# Patient Record
Sex: FEMALE | Race: WHITE | Employment: UNEMPLOYED | URBAN - METROPOLITAN AREA
[De-identification: names, ages, dates, MRNs, and addresses within clinical notes are randomized per-mention and may not be internally consistent; named-entity substitution may affect disease eponyms.]

---

## 2017-07-12 ENCOUNTER — HOSPITAL ENCOUNTER (OUTPATIENT)
Age: 47
Setting detail: OUTPATIENT SURGERY
End: 2017-07-12
Attending: OBSTETRICS & GYNECOLOGY | Admitting: OBSTETRICS & GYNECOLOGY

## 2017-07-30 RX ORDER — CEFAZOLIN SODIUM IN 0.9 % NACL 2 G/50 ML
2 INTRAVENOUS SOLUTION, PIGGYBACK (ML) INTRAVENOUS ONCE
Status: CANCELLED | OUTPATIENT
Start: 2017-07-30 | End: 2017-07-31

## 2017-08-01 ENCOUNTER — HOSPITAL ENCOUNTER (OUTPATIENT)
Dept: PREADMISSION TESTING | Age: 47
Discharge: HOME | End: 2017-08-01
Payer: COMMERCIAL

## 2017-08-01 VITALS
BODY MASS INDEX: 23.75 KG/M2 | HEART RATE: 82 BPM | SYSTOLIC BLOOD PRESSURE: 106 MMHG | WEIGHT: 139.13 LBS | RESPIRATION RATE: 20 BRPM | HEIGHT: 64 IN | DIASTOLIC BLOOD PRESSURE: 69 MMHG | TEMPERATURE: 98.1 F

## 2017-08-01 LAB
ERYTHROCYTE [DISTWIDTH] IN BLOOD BY AUTOMATED COUNT: 14.4 % (ref 11.5–14.5)
HCT VFR BLD AUTO: 34.7 % (ref 35–47)
HGB BLD-MCNC: 11.2 G/DL (ref 11.5–16)
MCH RBC QN AUTO: 27.4 PG (ref 26–34)
MCHC RBC AUTO-ENTMCNC: 32.3 G/DL (ref 30–36.5)
MCV RBC AUTO: 84.8 FL (ref 80–99)
PLATELET # BLD AUTO: 248 K/UL (ref 150–400)
RBC # BLD AUTO: 4.09 M/UL (ref 3.8–5.2)
WBC # BLD AUTO: 6.4 K/UL (ref 3.6–11)

## 2017-08-01 PROCEDURE — 85027 COMPLETE CBC AUTOMATED: CPT | Performed by: OBSTETRICS & GYNECOLOGY

## 2017-08-01 PROCEDURE — 36415 COLL VENOUS BLD VENIPUNCTURE: CPT | Performed by: OBSTETRICS & GYNECOLOGY

## 2021-12-14 NOTE — PERIOP NOTES
COVID QUESTIONS ANSWERED/PT IN [facility] 12-18 THRU 12-27/ PAT: 12-16-21 @ 0930/ WILL GET PCR COVID TST IN [facility]/GAVE OUR FAX NUMBER FOR RESULTS/INSTRUCTED TO TAKE COPY OF RESULTS TO HOSP DAY OF SX/INSTRUCTED TO BRING COVID CARD TO PAT APPT BY BOYD GUTIERRES

## 2021-12-16 ENCOUNTER — HOSPITAL ENCOUNTER (OUTPATIENT)
Dept: PREADMISSION TESTING | Age: 51
Discharge: HOME | End: 2021-12-16
Payer: COMMERCIAL

## 2021-12-16 VITALS
BODY MASS INDEX: 26.72 KG/M2 | TEMPERATURE: 98 F | HEIGHT: 64 IN | HEART RATE: 81 BPM | WEIGHT: 156.53 LBS | DIASTOLIC BLOOD PRESSURE: 85 MMHG | RESPIRATION RATE: 20 BRPM | SYSTOLIC BLOOD PRESSURE: 132 MMHG

## 2021-12-16 LAB
ATRIAL RATE: 74 BPM
CALCULATED P AXIS, ECG09: 74 DEGREES
CALCULATED R AXIS, ECG10: 33 DEGREES
CALCULATED T AXIS, ECG11: 20 DEGREES
DIAGNOSIS, 93000: NORMAL
ERYTHROCYTE [DISTWIDTH] IN BLOOD BY AUTOMATED COUNT: 14.8 % (ref 11.5–14.5)
HCT VFR BLD AUTO: 37.3 % (ref 35–47)
HGB BLD-MCNC: 11.7 G/DL (ref 11.5–16)
MCH RBC QN AUTO: 27.5 PG (ref 26–34)
MCHC RBC AUTO-ENTMCNC: 31.4 G/DL (ref 30–36.5)
MCV RBC AUTO: 87.8 FL (ref 80–99)
NRBC # BLD: 0 K/UL (ref 0–0.01)
NRBC BLD-RTO: 0 PER 100 WBC
P-R INTERVAL, ECG05: 168 MS
PLATELET # BLD AUTO: 240 K/UL (ref 150–400)
PMV BLD AUTO: 9.9 FL (ref 8.9–12.9)
Q-T INTERVAL, ECG07: 388 MS
QRS DURATION, ECG06: 78 MS
QTC CALCULATION (BEZET), ECG08: 430 MS
RBC # BLD AUTO: 4.25 M/UL (ref 3.8–5.2)
VENTRICULAR RATE, ECG03: 74 BPM
WBC # BLD AUTO: 6.7 K/UL (ref 3.6–11)

## 2021-12-16 PROCEDURE — 85027 COMPLETE CBC AUTOMATED: CPT

## 2021-12-16 PROCEDURE — 93005 ELECTROCARDIOGRAM TRACING: CPT

## 2021-12-16 NOTE — PERIOP NOTES
[facility]    Surgery Date:   12-28-21    Surgery arrival time given by surgeon: NO     If no, [place]'s staff will call you between 4 PM- 8 PM the day before surgery with your arrival time. If your surgery is on a Monday, we will call you the preceding Friday. Please call [phone] after 8 PM if you did not receive your arrival time. 1. Please report to [facility] Patient Access/Admitting on the 1st floor. Bring your insurance card, photo identification, and any copayment ( if applicable). 2. If you are going home the same day of your surgery, you must have a responsible adult to drive you home. You need to have a responsible adult to stay with you the first 24 hours after surgery and you should not drive a car for 24 hours following your surgery. 3. Nothing to eat or drink after midnight the night before surgery. This includes no water, gum, mints, coffee, juice, etc.  Please note special instructions, if applicable, below for medications. 4. Do NOT drink alcohol or smoke 24 hours before surgery. STOP smoking for 14 days prior as it helps with breathing and healing after surgery. 5. If you are being admitted to the hospital, please leave personal belongings/luggage in your car until you have an assigned hospital room number. 6. Please wear comfortable clothes. Wear your glasses instead of contacts. We ask that all money, jewelry and valuables be left at home. Wear no make up, particularly mascara, the day of surgery. 7.  All body piercings, rings, and jewelry need to be removed and left at home. Please wear your hair loose or down. Please no pony-tails, buns, or any metal hair accessories. If you shower the morning of surgery, please do not apply any lotions or powders afterwards. You may wear deodorant, unless having breast surgery. Do not shave any body area within 24 hours of your surgery.   8. Please follow all instructions to avoid any potential surgical cancellation. 9. Should your physical condition change, (i.e. fever, cold, flu, etc.) please notify your surgeon as soon as possible. 10. It is important to be on time. If a situation occurs where you may be delayed, please call:  [phone] / [phone] on the day of surgery. 11. The Preadmission Testing staff can be reached at [phone]. 12. Special instructions: NA      Current Outpatient Medications   Medication Sig    calcium-mag oxide-vitamin d3 185- mg-mg-unit cap Take 2 Tablets by mouth two (2) times a day.  acetaminophen/diphenhydramine (TYLENOL PM PO) Take  by mouth nightly as needed.  diphenhydramine HCl (UNISOM, DIPHENHYDRAMINE, PO) Take  by mouth nightly as needed.  OTHER JUICE PLUS 1 DAILY     No current facility-administered medications for this encounter. 1. YOU MUST ONLY TAKE THESE MEDICATIONS THE MORNING OF SURGERY WITH A SIP OF WATER: NA  2. MEDICATIONS TO TAKE THE MORNING OF SURGERY ONLY IF NEEDED: NA  3. HOLD these medications BEFORE Surgery: JUICE PLUS 7 DAYS PRIOR TO SURGERY  4. Ask your surgeon/prescribing physician about when/if to STOP taking these medications: NA  5. Stop all vitamins, herbal medicines and Aspirin containing products 7 days prior to surgery. Stop any non-steroidal anti-inflammatory drugs (i.e. Ibuprofen, Naproxen, Advil, Aleve) 3 days before surgery. You may take Tylenol. 6. If you are currently taking Plavix, Coumadin,or any other blood-thinning/anticoagulant medication contact your prescribing physician for instructions. Preventing Infections Before and After  Your Surgery    IMPORTANT INSTRUCTIONS    Please read and follow these instructions carefully. If you are unable to comply with the below instructions your procedure will be cancelled. You play an important role in your health and preparation for surgery.  To reduce the germs on your skin you will need to shower with CHG soap (Chorhexidine gluconate 4%) two times before surgery. CHG soap (Hibiclens, Hex-A-Clens or store brand)   CHG soap will be provided at your Preadmission Testing (PAT) appointment.  If you do not have a PAT appointment before surgery, you may arrange to  CHG soap from our office or purchase CHG soap at a pharmacy, grocery or department store.  You need to purchase TWO 4 ounce bottles to use for your 2 showers. Steps to follow:  1. Wash your hair with your normal shampoo and your body with regular soap and rinse well to remove shampoo and soap from your skin. 2. Wet a clean washcloth and turn off the shower. 3. Put CHG soap on washcloth and apply to your entire body from the neck down. Do not use on your head, face or private parts(genitals). Do not use CHG soap on open sores, wounds or areas of skin irritation. 4. Wash you body gently for 5 minutes. Do not wash your skin too hard. This soap does not create lather. Pay special attention to your underarms and from your belly button to your feet. 5. Turn the shower back on and rinse well to get CHG soap off your body. 6. Pat your skin dry with a clean, dry towel. Do not apply lotions or moisturizer. 7. Put on clean clothes and sleep on fresh bed sheets and do not allow pets to sleep with you. Shower with CHG soap 2 times before your surgery   The evening before your surgery   The morning of your surgery      Tips to help prevent infections after your surgery:  1. Protect your surgical wound from germs:  ? Hand washing is the most important thing you and your caregivers can do to prevent infections. ? Keep your bandage clean and dry! ? Do not touch your surgical wound. 2. Use clean, freshly washed towels and washcloths every time you shower; do not share bath linens with others. 3. Until your surgical wound is healed, wear clothing and sleep on bed linens each day that are clean and freshly washed.   4. Do not allow pets to sleep in your bed with you or touch your surgical wound.  5. Do not smoke  smoking delays wound healing. This may be a good time to stop smoking. 6. If you have diabetes, it is important for you to manage your blood sugar levels properly before your surgery as well as after your surgery. Poorly managed blood sugar levels slow down wound healing and prevent you from healing completely. Good Restorationist   Instructions for Pre-Surgery COVID-19 Testing     Across our ministry we have established standard guidelines to ensure the health and safety of our patients, residents and associates as we resume elective services for patients. All patients presenting for surgery are required to have a COVID-19 test result within 96 hours of their scheduled surgery. [facility] is providing this test free of charge to the patient.    Instructions for COVID-19 Testing:     Patients will receive a call from Pre-Admission Testing 4-5 days prior to surgery to schedule a date and time to come to the [address] [place] [address] Drive for their COVID-19 test   Patients are advised to self-quarantine after testing until their scheduled surgery   Once on site, patients will be registered and receive COVID test in their vehicle   If a patient is scheduled for normal Pre Admission Testing 96 hours from date of surgery, the patient will still have their COVID test done at the [address] [place] [address] located at [address] [place] [address] Positive results will be shared with the surgeon and anesthesiologist and may result in cancellation of the elective procedure    Testing Hours and Location:   Address:  [address] [place] [address] Up Pre Admission [address] [facility] in the Discharge Lot on [facility] (Map Attached)  [address] [facility], [address]   Hours: Monday- Friday 7a-3p    PAT Phone Number: [phone]            Patient Information Regarding COVID Restrictions    Patients are advised to self-quarantine after COVID testing up to the day of the scheduled procedure. Day of Procedure     Please park in the parking deck or any designated visitor parking lot.  Enter the facility through the Main Entrance of the hospital.   A temperature check and appropriate symptom/exposure screening will be done prior to entry to the facility.  On the day of surgery, please provide the cell phone number of the person who will be waiting for you to the Patient Access representative at the time of registration.  Please wear a mask on the day of your procedure.  We are now allowing one designated visitor per stay. Pediatric patients may have 2 designated visitors. This one person may come in with you on the day of your procedure.  No visitors under the age of 13.  The designated visitor must also wear a mask.  Once your procedure and the immediate recovery period is completed, a nurse in the recovery area will contact your designated visitor to inform them of your room number or to otherwise review other pertinent information regarding your care.  Social distancing practices are to be adhered to in waiting areas and the cafeteria. The patient was contacted  in person. He  verbalize  understanding of all instructions does not  need reinforcement.

## 2021-12-27 ENCOUNTER — ANESTHESIA EVENT (OUTPATIENT)
Dept: SURGERY | Age: 51
End: 2021-12-27
Payer: COMMERCIAL

## 2021-12-28 ENCOUNTER — HOSPITAL ENCOUNTER (OUTPATIENT)
Age: 51
Discharge: HOME | End: 2021-12-29
Attending: OBSTETRICS & GYNECOLOGY | Admitting: OBSTETRICS & GYNECOLOGY
Payer: COMMERCIAL

## 2021-12-28 ENCOUNTER — ANESTHESIA (OUTPATIENT)
Dept: SURGERY | Age: 51
End: 2021-12-28
Payer: COMMERCIAL

## 2021-12-28 DIAGNOSIS — G89.18 POSTOPERATIVE PAIN: Primary | ICD-10-CM

## 2021-12-28 PROBLEM — Z98.890 STATUS POST SURGERY: Status: ACTIVE | Noted: 2021-12-28

## 2021-12-28 LAB — HCG UR QL: NEGATIVE

## 2021-12-28 PROCEDURE — 88307 TISSUE EXAM BY PATHOLOGIST: CPT

## 2021-12-28 PROCEDURE — 74011250636 HC RX REV CODE- 250/636: Performed by: NURSE ANESTHETIST, CERTIFIED REGISTERED

## 2021-12-28 PROCEDURE — 77030038613 HC SUT PDS STRATA SPIRL J&J -B: Performed by: OBSTETRICS & GYNECOLOGY

## 2021-12-28 PROCEDURE — 36415 COLL VENOUS BLD VENIPUNCTURE: CPT

## 2021-12-28 PROCEDURE — 77030035277 HC OBTRTR BLDELSS DISP INTU -B: Performed by: OBSTETRICS & GYNECOLOGY

## 2021-12-28 PROCEDURE — 74011250636 HC RX REV CODE- 250/636: Performed by: OBSTETRICS & GYNECOLOGY

## 2021-12-28 PROCEDURE — 77030020703 HC SEAL CANN DISP INTU -B: Performed by: OBSTETRICS & GYNECOLOGY

## 2021-12-28 PROCEDURE — G0378 HOSPITAL OBSERVATION PER HR: HCPCS

## 2021-12-28 PROCEDURE — 88305 TISSUE EXAM BY PATHOLOGIST: CPT

## 2021-12-28 PROCEDURE — 77030002933 HC SUT MCRYL J&J -A: Performed by: OBSTETRICS & GYNECOLOGY

## 2021-12-28 PROCEDURE — 88304 TISSUE EXAM BY PATHOLOGIST: CPT

## 2021-12-28 PROCEDURE — C1771 REP DEV, URINARY, W/SLING: HCPCS | Performed by: OBSTETRICS & GYNECOLOGY

## 2021-12-28 PROCEDURE — 74011250637 HC RX REV CODE- 250/637: Performed by: ANESTHESIOLOGY

## 2021-12-28 PROCEDURE — 77030013079 HC BLNKT BAIR HGGR 3M -A: Performed by: ANESTHESIOLOGY

## 2021-12-28 PROCEDURE — 76210000016 HC OR PH I REC 1 TO 1.5 HR: Performed by: OBSTETRICS & GYNECOLOGY

## 2021-12-28 PROCEDURE — 77030026438 HC STYL ET INTUB CARD -A: Performed by: ANESTHESIOLOGY

## 2021-12-28 PROCEDURE — 74011250636 HC RX REV CODE- 250/636: Performed by: ANESTHESIOLOGY

## 2021-12-28 PROCEDURE — 76060000036 HC ANESTHESIA 2.5 TO 3 HR: Performed by: OBSTETRICS & GYNECOLOGY

## 2021-12-28 PROCEDURE — 77030031492 HC PRT ACC BLNT AIRSEAL CNMD -B: Performed by: OBSTETRICS & GYNECOLOGY

## 2021-12-28 PROCEDURE — 77030003578 HC NDL INSUF VERES AMR -B: Performed by: OBSTETRICS & GYNECOLOGY

## 2021-12-28 PROCEDURE — 76010000877 HC OR TIME 2.5 TO 3HR INTENSV - TIER 2: Performed by: OBSTETRICS & GYNECOLOGY

## 2021-12-28 PROCEDURE — 81025 URINE PREGNANCY TEST: CPT

## 2021-12-28 PROCEDURE — 77030018778 HC MANIP UTER VCAR CNMD -B: Performed by: OBSTETRICS & GYNECOLOGY

## 2021-12-28 PROCEDURE — 77030010517 HC APPL SEAL FLOSEL BAXT -B: Performed by: OBSTETRICS & GYNECOLOGY

## 2021-12-28 PROCEDURE — 74011000250 HC RX REV CODE- 250: Performed by: OBSTETRICS & GYNECOLOGY

## 2021-12-28 PROCEDURE — 77030008684 HC TU ET CUF COVD -B: Performed by: ANESTHESIOLOGY

## 2021-12-28 PROCEDURE — 74011250637 HC RX REV CODE- 250/637: Performed by: OBSTETRICS & GYNECOLOGY

## 2021-12-28 PROCEDURE — 74011000258 HC RX REV CODE- 258

## 2021-12-28 PROCEDURE — 74011000250 HC RX REV CODE- 250: Performed by: NURSE ANESTHETIST, CERTIFIED REGISTERED

## 2021-12-28 PROCEDURE — 2709999900 HC NON-CHARGEABLE SUPPLY: Performed by: OBSTETRICS & GYNECOLOGY

## 2021-12-28 PROCEDURE — 77030019927 HC TBNG IRR CYSTO BAXT -A: Performed by: OBSTETRICS & GYNECOLOGY

## 2021-12-28 PROCEDURE — 77030039895 HC SYST SMK EVAC LAP COVD -B: Performed by: OBSTETRICS & GYNECOLOGY

## 2021-12-28 PROCEDURE — 77030040922 HC BLNKT HYPOTHRM STRY -A

## 2021-12-28 DEVICE — SLING INCONT RETROPUBIC CONTINENCE SYS GYNECARE TVT EXACT: Type: IMPLANTABLE DEVICE | Site: VAGINA | Status: FUNCTIONAL

## 2021-12-28 RX ORDER — ONDANSETRON 2 MG/ML
4 INJECTION INTRAMUSCULAR; INTRAVENOUS AS NEEDED
Status: DISCONTINUED | OUTPATIENT
Start: 2021-12-28 | End: 2021-12-28 | Stop reason: HOSPADM

## 2021-12-28 RX ORDER — ACETAMINOPHEN 325 MG/1
650 TABLET ORAL ONCE
Status: COMPLETED | OUTPATIENT
Start: 2021-12-28 | End: 2021-12-28

## 2021-12-28 RX ORDER — SODIUM CHLORIDE 0.9 % (FLUSH) 0.9 %
5-40 SYRINGE (ML) INJECTION AS NEEDED
Status: DISCONTINUED | OUTPATIENT
Start: 2021-12-28 | End: 2021-12-28 | Stop reason: HOSPADM

## 2021-12-28 RX ORDER — METRONIDAZOLE 500 MG/100ML
500 INJECTION, SOLUTION INTRAVENOUS ONCE
Status: COMPLETED | OUTPATIENT
Start: 2021-12-28 | End: 2021-12-28

## 2021-12-28 RX ORDER — FENTANYL CITRATE 50 UG/ML
50 INJECTION, SOLUTION INTRAMUSCULAR; INTRAVENOUS AS NEEDED
Status: DISCONTINUED | OUTPATIENT
Start: 2021-12-28 | End: 2021-12-28 | Stop reason: HOSPADM

## 2021-12-28 RX ORDER — DEXAMETHASONE SODIUM PHOSPHATE 4 MG/ML
INJECTION, SOLUTION INTRA-ARTICULAR; INTRALESIONAL; INTRAMUSCULAR; INTRAVENOUS; SOFT TISSUE AS NEEDED
Status: DISCONTINUED | OUTPATIENT
Start: 2021-12-28 | End: 2021-12-28 | Stop reason: HOSPADM

## 2021-12-28 RX ORDER — SODIUM CHLORIDE 0.9 % (FLUSH) 0.9 %
5-40 SYRINGE (ML) INJECTION EVERY 8 HOURS
Status: DISCONTINUED | OUTPATIENT
Start: 2021-12-28 | End: 2021-12-28 | Stop reason: HOSPADM

## 2021-12-28 RX ORDER — ROCURONIUM BROMIDE 10 MG/ML
INJECTION, SOLUTION INTRAVENOUS AS NEEDED
Status: DISCONTINUED | OUTPATIENT
Start: 2021-12-28 | End: 2021-12-28 | Stop reason: HOSPADM

## 2021-12-28 RX ORDER — PROCHLORPERAZINE EDISYLATE 5 MG/ML
INJECTION INTRAMUSCULAR; INTRAVENOUS
Status: DISPENSED
Start: 2021-12-28 | End: 2021-12-29

## 2021-12-28 RX ORDER — GLYCOPYRROLATE 0.2 MG/ML
INJECTION INTRAMUSCULAR; INTRAVENOUS AS NEEDED
Status: DISCONTINUED | OUTPATIENT
Start: 2021-12-28 | End: 2021-12-28 | Stop reason: HOSPADM

## 2021-12-28 RX ORDER — PHENYLEPHRINE HCL IN 0.9% NACL 0.4MG/10ML
SYRINGE (ML) INTRAVENOUS AS NEEDED
Status: DISCONTINUED | OUTPATIENT
Start: 2021-12-28 | End: 2021-12-28 | Stop reason: HOSPADM

## 2021-12-28 RX ORDER — NEOSTIGMINE METHYLSULFATE 1 MG/ML
INJECTION INTRAVENOUS AS NEEDED
Status: DISCONTINUED | OUTPATIENT
Start: 2021-12-28 | End: 2021-12-28 | Stop reason: HOSPADM

## 2021-12-28 RX ORDER — MORPHINE SULFATE 2 MG/ML
2 INJECTION, SOLUTION INTRAMUSCULAR; INTRAVENOUS
Status: DISCONTINUED | OUTPATIENT
Start: 2021-12-28 | End: 2021-12-29

## 2021-12-28 RX ORDER — KETOROLAC TROMETHAMINE 30 MG/ML
30 INJECTION, SOLUTION INTRAMUSCULAR; INTRAVENOUS
Status: DISCONTINUED | OUTPATIENT
Start: 2021-12-28 | End: 2021-12-29

## 2021-12-28 RX ORDER — SODIUM CHLORIDE 9 MG/ML
INJECTION, SOLUTION INTRAVENOUS
Status: COMPLETED
Start: 2021-12-28 | End: 2021-12-28

## 2021-12-28 RX ORDER — DIPHENHYDRAMINE HYDROCHLORIDE 50 MG/ML
12.5 INJECTION, SOLUTION INTRAMUSCULAR; INTRAVENOUS
Status: DISCONTINUED | OUTPATIENT
Start: 2021-12-28 | End: 2021-12-29 | Stop reason: HOSPADM

## 2021-12-28 RX ORDER — SODIUM CHLORIDE, SODIUM LACTATE, POTASSIUM CHLORIDE, CALCIUM CHLORIDE 600; 310; 30; 20 MG/100ML; MG/100ML; MG/100ML; MG/100ML
50 INJECTION, SOLUTION INTRAVENOUS CONTINUOUS
Status: DISCONTINUED | OUTPATIENT
Start: 2021-12-28 | End: 2021-12-28 | Stop reason: HOSPADM

## 2021-12-28 RX ORDER — SODIUM CHLORIDE 0.9 % (FLUSH) 0.9 %
5-40 SYRINGE (ML) INJECTION EVERY 8 HOURS
Status: DISCONTINUED | OUTPATIENT
Start: 2021-12-28 | End: 2021-12-29 | Stop reason: HOSPADM

## 2021-12-28 RX ORDER — MIDAZOLAM HYDROCHLORIDE 1 MG/ML
INJECTION, SOLUTION INTRAMUSCULAR; INTRAVENOUS AS NEEDED
Status: DISCONTINUED | OUTPATIENT
Start: 2021-12-28 | End: 2021-12-28 | Stop reason: HOSPADM

## 2021-12-28 RX ORDER — SCOLOPAMINE TRANSDERMAL SYSTEM 1 MG/1
1 PATCH, EXTENDED RELEASE TRANSDERMAL ONCE
Status: COMPLETED | OUTPATIENT
Start: 2021-12-28 | End: 2021-12-28

## 2021-12-28 RX ORDER — DEXTROSE, SODIUM CHLORIDE, SODIUM LACTATE, POTASSIUM CHLORIDE, AND CALCIUM CHLORIDE 5; .6; .31; .03; .02 G/100ML; G/100ML; G/100ML; G/100ML; G/100ML
125 INJECTION, SOLUTION INTRAVENOUS CONTINUOUS
Status: DISCONTINUED | OUTPATIENT
Start: 2021-12-28 | End: 2021-12-29 | Stop reason: HOSPADM

## 2021-12-28 RX ORDER — SODIUM CHLORIDE 0.9 % (FLUSH) 0.9 %
5-40 SYRINGE (ML) INJECTION AS NEEDED
Status: DISCONTINUED | OUTPATIENT
Start: 2021-12-28 | End: 2021-12-29 | Stop reason: HOSPADM

## 2021-12-28 RX ORDER — PROPOFOL 10 MG/ML
INJECTION, EMULSION INTRAVENOUS AS NEEDED
Status: DISCONTINUED | OUTPATIENT
Start: 2021-12-28 | End: 2021-12-28 | Stop reason: HOSPADM

## 2021-12-28 RX ORDER — FENTANYL CITRATE 50 UG/ML
25 INJECTION, SOLUTION INTRAMUSCULAR; INTRAVENOUS
Status: DISCONTINUED | OUTPATIENT
Start: 2021-12-28 | End: 2021-12-28 | Stop reason: HOSPADM

## 2021-12-28 RX ORDER — HYDROMORPHONE HYDROCHLORIDE 1 MG/ML
0.2 INJECTION, SOLUTION INTRAMUSCULAR; INTRAVENOUS; SUBCUTANEOUS
Status: DISCONTINUED | OUTPATIENT
Start: 2021-12-28 | End: 2021-12-28 | Stop reason: HOSPADM

## 2021-12-28 RX ORDER — SODIUM CHLORIDE, SODIUM LACTATE, POTASSIUM CHLORIDE, CALCIUM CHLORIDE 600; 310; 30; 20 MG/100ML; MG/100ML; MG/100ML; MG/100ML
INJECTION, SOLUTION INTRAVENOUS
Status: DISCONTINUED | OUTPATIENT
Start: 2021-12-28 | End: 2021-12-28 | Stop reason: HOSPADM

## 2021-12-28 RX ORDER — KETOROLAC TROMETHAMINE 30 MG/ML
INJECTION, SOLUTION INTRAMUSCULAR; INTRAVENOUS AS NEEDED
Status: DISCONTINUED | OUTPATIENT
Start: 2021-12-28 | End: 2021-12-28 | Stop reason: HOSPADM

## 2021-12-28 RX ORDER — DOCUSATE SODIUM 100 MG/1
100 CAPSULE, LIQUID FILLED ORAL 2 TIMES DAILY
Status: DISCONTINUED | OUTPATIENT
Start: 2021-12-28 | End: 2021-12-29 | Stop reason: HOSPADM

## 2021-12-28 RX ORDER — FENTANYL CITRATE 50 UG/ML
INJECTION, SOLUTION INTRAMUSCULAR; INTRAVENOUS AS NEEDED
Status: DISCONTINUED | OUTPATIENT
Start: 2021-12-28 | End: 2021-12-28 | Stop reason: HOSPADM

## 2021-12-28 RX ORDER — BUPIVACAINE HYDROCHLORIDE 5 MG/ML
INJECTION, SOLUTION EPIDURAL; INTRACAUDAL AS NEEDED
Status: DISCONTINUED | OUTPATIENT
Start: 2021-12-28 | End: 2021-12-28 | Stop reason: HOSPADM

## 2021-12-28 RX ORDER — HYDROCODONE BITARTRATE AND ACETAMINOPHEN 5; 325 MG/1; MG/1
1 TABLET ORAL
Status: DISCONTINUED | OUTPATIENT
Start: 2021-12-28 | End: 2021-12-29 | Stop reason: HOSPADM

## 2021-12-28 RX ORDER — ONDANSETRON 2 MG/ML
INJECTION INTRAMUSCULAR; INTRAVENOUS AS NEEDED
Status: DISCONTINUED | OUTPATIENT
Start: 2021-12-28 | End: 2021-12-28 | Stop reason: HOSPADM

## 2021-12-28 RX ORDER — LIDOCAINE HYDROCHLORIDE 10 MG/ML
0.1 INJECTION, SOLUTION EPIDURAL; INFILTRATION; INTRACAUDAL; PERINEURAL AS NEEDED
Status: DISCONTINUED | OUTPATIENT
Start: 2021-12-28 | End: 2021-12-28 | Stop reason: HOSPADM

## 2021-12-28 RX ORDER — HYDROMORPHONE HYDROCHLORIDE 2 MG/ML
INJECTION, SOLUTION INTRAMUSCULAR; INTRAVENOUS; SUBCUTANEOUS AS NEEDED
Status: DISCONTINUED | OUTPATIENT
Start: 2021-12-28 | End: 2021-12-28 | Stop reason: HOSPADM

## 2021-12-28 RX ORDER — MIDAZOLAM HYDROCHLORIDE 1 MG/ML
1 INJECTION, SOLUTION INTRAMUSCULAR; INTRAVENOUS AS NEEDED
Status: DISCONTINUED | OUTPATIENT
Start: 2021-12-28 | End: 2021-12-28 | Stop reason: HOSPADM

## 2021-12-28 RX ORDER — ONDANSETRON 2 MG/ML
4 INJECTION INTRAMUSCULAR; INTRAVENOUS
Status: DISCONTINUED | OUTPATIENT
Start: 2021-12-28 | End: 2021-12-29 | Stop reason: HOSPADM

## 2021-12-28 RX ORDER — LIDOCAINE HYDROCHLORIDE 20 MG/ML
INJECTION, SOLUTION EPIDURAL; INFILTRATION; INTRACAUDAL; PERINEURAL AS NEEDED
Status: DISCONTINUED | OUTPATIENT
Start: 2021-12-28 | End: 2021-12-28 | Stop reason: HOSPADM

## 2021-12-28 RX ADMIN — Medication 120 MCG: at 10:05

## 2021-12-28 RX ADMIN — HYDROCODONE BITARTRATE AND ACETAMINOPHEN 1 TABLET: 5; 325 TABLET ORAL at 18:47

## 2021-12-28 RX ADMIN — KETOROLAC TROMETHAMINE 30 MG: 30 INJECTION, SOLUTION INTRAMUSCULAR; INTRAVENOUS at 12:17

## 2021-12-28 RX ADMIN — HYDROMORPHONE HYDROCHLORIDE 0.5 MG: 2 INJECTION, SOLUTION INTRAMUSCULAR; INTRAVENOUS; SUBCUTANEOUS at 12:17

## 2021-12-28 RX ADMIN — SODIUM CHLORIDE, SODIUM LACTATE, POTASSIUM CHLORIDE, CALCIUM CHLORIDE AND DEXTROSE MONOHYDRATE 125 ML/HR: 5; 600; 310; 30; 20 INJECTION, SOLUTION INTRAVENOUS at 18:47

## 2021-12-28 RX ADMIN — SODIUM CHLORIDE, POTASSIUM CHLORIDE, SODIUM LACTATE AND CALCIUM CHLORIDE: 600; 310; 30; 20 INJECTION, SOLUTION INTRAVENOUS at 11:39

## 2021-12-28 RX ADMIN — ROCURONIUM BROMIDE 20 MG: 10 SOLUTION INTRAVENOUS at 10:31

## 2021-12-28 RX ADMIN — NEOSTIGMINE METHYLSULFATE 3 MG: 1 INJECTION, SOLUTION INTRAVENOUS at 12:19

## 2021-12-28 RX ADMIN — MIDAZOLAM 1 MG: 1 INJECTION INTRAMUSCULAR; INTRAVENOUS at 09:54

## 2021-12-28 RX ADMIN — FENTANYL CITRATE 75 MCG: 50 INJECTION, SOLUTION INTRAMUSCULAR; INTRAVENOUS at 10:30

## 2021-12-28 RX ADMIN — WATER 2 G: 1 INJECTION INTRAMUSCULAR; INTRAVENOUS; SUBCUTANEOUS at 10:00

## 2021-12-28 RX ADMIN — PROPOFOL 200 MG: 10 INJECTION, EMULSION INTRAVENOUS at 09:54

## 2021-12-28 RX ADMIN — SODIUM CHLORIDE, POTASSIUM CHLORIDE, SODIUM LACTATE AND CALCIUM CHLORIDE: 600; 310; 30; 20 INJECTION, SOLUTION INTRAVENOUS at 09:48

## 2021-12-28 RX ADMIN — ROCURONIUM BROMIDE 30 MG: 10 SOLUTION INTRAVENOUS at 09:54

## 2021-12-28 RX ADMIN — PROCHLORPERAZINE EDISYLATE 5 MG: 5 INJECTION, SOLUTION INTRAMUSCULAR; INTRAVENOUS at 13:35

## 2021-12-28 RX ADMIN — SODIUM CHLORIDE, POTASSIUM CHLORIDE, SODIUM LACTATE AND CALCIUM CHLORIDE 50 ML/HR: 600; 310; 30; 20 INJECTION, SOLUTION INTRAVENOUS at 08:34

## 2021-12-28 RX ADMIN — SCOPALAMINE 1 PATCH: 1 PATCH, EXTENDED RELEASE TRANSDERMAL at 09:47

## 2021-12-28 RX ADMIN — DEXAMETHASONE SODIUM PHOSPHATE 4 MG: 4 INJECTION, SOLUTION INTRAMUSCULAR; INTRAVENOUS at 10:42

## 2021-12-28 RX ADMIN — Medication 10 ML: at 23:35

## 2021-12-28 RX ADMIN — HYDROMORPHONE HYDROCHLORIDE 0.5 MG: 2 INJECTION, SOLUTION INTRAMUSCULAR; INTRAVENOUS; SUBCUTANEOUS at 12:24

## 2021-12-28 RX ADMIN — MIDAZOLAM 1 MG: 1 INJECTION INTRAMUSCULAR; INTRAVENOUS at 09:48

## 2021-12-28 RX ADMIN — METRONIDAZOLE 500 MG: 500 SOLUTION INTRAVENOUS at 10:00

## 2021-12-28 RX ADMIN — DOCUSATE SODIUM 100 MG: 100 CAPSULE ORAL at 18:47

## 2021-12-28 RX ADMIN — SODIUM CHLORIDE 50 ML: 900 INJECTION, SOLUTION INTRAVENOUS at 13:35

## 2021-12-28 RX ADMIN — FENTANYL CITRATE 25 MCG: 50 INJECTION, SOLUTION INTRAMUSCULAR; INTRAVENOUS at 09:54

## 2021-12-28 RX ADMIN — ACETAMINOPHEN 650 MG: 325 TABLET ORAL at 08:34

## 2021-12-28 RX ADMIN — ONDANSETRON 4 MG: 2 INJECTION INTRAMUSCULAR; INTRAVENOUS at 12:46

## 2021-12-28 RX ADMIN — ROCURONIUM BROMIDE 20 MG: 10 SOLUTION INTRAVENOUS at 11:13

## 2021-12-28 RX ADMIN — GLYCOPYRROLATE 0.6 MG: 0.2 INJECTION, SOLUTION INTRAMUSCULAR; INTRAVENOUS at 12:19

## 2021-12-28 RX ADMIN — HYDROCODONE BITARTRATE AND ACETAMINOPHEN 1 TABLET: 5; 325 TABLET ORAL at 23:34

## 2021-12-28 RX ADMIN — ONDANSETRON HYDROCHLORIDE 4 MG: 2 INJECTION, SOLUTION INTRAMUSCULAR; INTRAVENOUS at 12:17

## 2021-12-28 RX ADMIN — LIDOCAINE HYDROCHLORIDE 100 MG: 20 INJECTION, SOLUTION EPIDURAL; INFILTRATION; INTRACAUDAL; PERINEURAL at 09:54

## 2021-12-28 NOTE — PERIOP NOTES
TRANSFER - OUT REPORT:    Verbal report given to usman Diaz(name) on Ann Matos  being transferred to 301(unit) for routine post - op       Report consisted of patients Situation, Background, Assessment and   Recommendations(SBAR). Time Pre op antibiotic given  :10am  Ancef+Flagyl  Anesthesia Stop time:  Nj Present on Transfer to floor:yes  Order for Nj on Chart:yes  Discharge Prescriptions with Chart:no    Information from the following report(s) SBAR, Kardex, OR Summary, Procedure Summary, Intake/Output, MAR, Recent Results, Med Rec Status and Cardiac Rhythm SR was reviewed with the receiving nurse. Opportunity for questions and clarification was provided. Is the patient on 02? NO       L/Min        Other     Is the patient on a monitor? NO    Is the nurse transporting with the patient? NO    Surgical Waiting Area notified of patient's transfer from PACU? YES      The following personal items collected during your admission accompanied patient upon transfer:   Dental Appliance: Dental Appliances: None  Vision: Visual Aid: None  Hearing Aid:    Jewelry: Jewelry: None  Clothing: Clothing:  (bag of belongings returned to pt.)  Other Valuables:  Other Valuables: None  Valuables sent to safe:

## 2021-12-28 NOTE — BRIEF OP NOTE
Brief Postoperative Note    Patient: Fred Odonnell  YOB: 1970  MRN: [id number]    Date of Procedure: 12/28/2021     Pre-Op Diagnosis: MENORRHAGIA, DYSMENORRHEA, SUSPENTEC ADENOMYOSIS, STRESS URINARY INCONTINENCE    Post-Op Diagnosis: Same as preoperative diagnosis. Bladder mass right tirgone- benign appearing    Procedure(s):  Dr. Reji Motta, BILATERAL SALPINGECTOMY, Brittney Valdivia,   Dr. Quinonez Maplewood    Surgeon(s):  Gaudencio Eden, [address] [place] [address], [address] [place] [address], MD    Surgical Assistant: Surg Asst-1: Agnes Archibald    Anesthesia: General     Estimated Blood Loss (mL): Minimal    Complications: None    Specimens:   ID Type Source Tests Collected by Time Destination   1 : CERVIX, UTERUS, BILATERAL TUBES AND OVARIES Fresh Uterus  Gaudencio Eden, DO 12/28/2021 1156 Pathology   2 : NECROTIC EPIPLOIC Fresh Other                  Gaudencio Eden, DO 12/28/2021 1158 Pathology   3 : BLADDER BIOPSY TRIGONE Fresh Bladder  Irene Duval MD 12/28/2021 1202 Pathology        Implants: * No implants in log * TVT Exact    Drains: * No LDAs found *    Findings: hypermobile urethra, normal functioning ureters, bladder mucosa w benign ectasia of trigone and ? Calcification/mass of the right tirgone.  No inadvertent cystotomy    Electronically Signed by Merlin Snyder MD on 12/28/2021 at 12:31 PM

## 2021-12-28 NOTE — BRIEF OP NOTE
Brief Postoperative Note    Patient: Anahi Alba  YOB: 1970  MRN: [id number]    Date of Procedure: 12/28/2021     Pre-Op Diagnosis: MENORRHAGIA, DYSMENORRHEA, SUSPECT ADENOMYOSIS, STRESS URINARY INCONTINENCE    Post-Op Diagnosis: Same as preoperative diagnosis. Procedure(s):  DAVINCI TOTAL LAPAROSCOPIC HYSTERECTOMY, BILATERAL SALPINGO-OOPHORECTOMY, TVT SLING, CYSTOSCOPY    Surgeon(s):  DO To Martínez MD    Surgical Assistant: Surg Asst-1: Yvette Parkinson    Anesthesia: General     Estimated Blood Loss (mL): less than 835     Complications: None    Specimens:   ID Type Source Tests Collected by Time Destination   1 : CERVIX, UTERUS, BILATERAL TUBES AND OVARIES Fresh Uterus  Gary Michelle DO 12/28/2021 1156 Pathology   2 : NECROTIC EPIPLOIC Fresh 2 HOUR URINE SAMPLE  Gary Michelle DO 12/28/2021 1158 Pathology        Implants: Flowseal  Drains: * No LDAs found *    Findings: EUA-10 week size mobile anteverted uterus with no adnexal masses. Intraop-Uterus sounded to 11cm. Boggy mobile uterus. Normal fallopian tubes and ovaries bilaterally, 1cm necrotic epiploaca.      Electronically Signed by Nora Lindsay DO on 12/28/2021 at 12:02 PM

## 2021-12-28 NOTE — PROGRESS NOTES
Gynecology Progress Note    Patient doing well post-op day 0 from Procedure(s):  DAVINCI TOTAL LAPAROSCOPIC HYSTERECTOMY,BILATERAL SALPINGO-OOPHORECTOMY, TVT SLING, CYSTOSCOPY without significant complaints. Pain controlled on current medication. Voiding without difficulty. Patient had nausea earlier but feeling better now. Denies cp/sob. Vitals:  Blood pressure 118/67, pulse 97, temperature 97.7 °F (36.5 °C), resp. rate 16, height 5' 4\" (1.626 m), weight 71 kg (156 lb 8.4 oz), last menstrual period 2021, SpO2 99 %. Temp (24hrs), Av.1 °F (36.7 °C), Min:97.1 °F (36.2 °C), Max:99.5 °F (37.5 °C)        Exam:  Patient without distress. Heart regular rate and rhythm   Lungs CTA b/l               Abdomen soft,  Nontender, mildly distended, hypoactive bowel sounds. Incisions dry and clean without erythema. Lower extremities are negative for swelling, cords, or tenderness. Lab/Data Review:  no new labs    Assessment and Plan:  Patient appears to be having uncomplicated post Procedure(s):  DAVINCI TOTAL LAPAROSCOPIC HYSTERECTOMY, BILATERAL SALPINGO-OOPHORECTOMY, TVT SLING, CYSTOSCOPY course. Continue routine post-op care. Hemodynamically stable. Pain controlled. Will advance diet as tolerated. Plan for freitas out in am with bladder challenge per Dr All Molina orders. OOB to chair this evening if able.

## 2021-12-28 NOTE — ANESTHESIA POSTPROCEDURE EVALUATION
Procedure(s):  DAVINCI TOTAL LAPAROSCOPIC HYSTERECTOMY, BILATERAL SALPINGECTOMY, POSSIBLE BILATERAL OOPHORECTOMY, TVT SLING, CYSTOSCOPY. general    Anesthesia Post Evaluation        Patient participation: complete - patient participated  Level of consciousness: awake  Pain management: adequate  Airway patency: patent  Anesthetic complications: no  Cardiovascular status: hemodynamically stable  Respiratory status: acceptable  Hydration status: acceptable  Comments: The patient is ready for PACU discharge. [address], DO                   Post anesthesia nausea and vomiting:  controlled      INITIAL Post-op Vital signs:   Vitals Value Taken Time   /63 12/28/21 1345   Temp 36.2 °C (97.1 °F) 12/28/21 1340   Pulse 88 12/28/21 1351   Resp 15 12/28/21 1351   SpO2 97 % 12/28/21 1351   Vitals shown include unvalidated device data.

## 2021-12-28 NOTE — PROGRESS NOTES
TRANSFER - IN REPORT:    Verbal report received from [address] (name) on Diaz Lis  being received from PACU (unit) for routine post - op      Report consisted of patients Situation, Background, Assessment and   Recommendations(SBAR). Information from the following report(s) SBAR, Kardex, OR Summary and MAR was reviewed with the receiving nurse. Opportunity for questions and clarification was provided. Assessment completed upon patients arrival to unit and care assumed.

## 2021-12-29 VITALS
RESPIRATION RATE: 15 BRPM | SYSTOLIC BLOOD PRESSURE: 114 MMHG | TEMPERATURE: 98.4 F | BODY MASS INDEX: 26.72 KG/M2 | HEIGHT: 64 IN | WEIGHT: 156.53 LBS | DIASTOLIC BLOOD PRESSURE: 72 MMHG | OXYGEN SATURATION: 98 % | HEART RATE: 92 BPM

## 2021-12-29 LAB
ERYTHROCYTE [DISTWIDTH] IN BLOOD BY AUTOMATED COUNT: 15.6 % (ref 11.5–14.5)
HCT VFR BLD AUTO: 34 % (ref 35–47)
HGB BLD-MCNC: 10.9 G/DL (ref 11.5–16)
MCH RBC QN AUTO: 27.5 PG (ref 26–34)
MCHC RBC AUTO-ENTMCNC: 32.1 G/DL (ref 30–36.5)
MCV RBC AUTO: 85.9 FL (ref 80–99)
NRBC # BLD: 0 K/UL (ref 0–0.01)
NRBC BLD-RTO: 0 PER 100 WBC
PLATELET # BLD AUTO: 229 K/UL (ref 150–400)
PMV BLD AUTO: 9.5 FL (ref 8.9–12.9)
RBC # BLD AUTO: 3.96 M/UL (ref 3.8–5.2)
WBC # BLD AUTO: 15.1 K/UL (ref 3.6–11)

## 2021-12-29 PROCEDURE — G0378 HOSPITAL OBSERVATION PER HR: HCPCS

## 2021-12-29 PROCEDURE — 36415 COLL VENOUS BLD VENIPUNCTURE: CPT

## 2021-12-29 PROCEDURE — 74011250637 HC RX REV CODE- 250/637: Performed by: OBSTETRICS & GYNECOLOGY

## 2021-12-29 PROCEDURE — 85027 COMPLETE CBC AUTOMATED: CPT

## 2021-12-29 PROCEDURE — 74011250636 HC RX REV CODE- 250/636: Performed by: OBSTETRICS & GYNECOLOGY

## 2021-12-29 RX ORDER — HYDROCODONE BITARTRATE AND ACETAMINOPHEN 5; 325 MG/1; MG/1
1 TABLET ORAL
Qty: 10 TABLET | Refills: 0 | Status: SHIPPED | OUTPATIENT
Start: 2021-12-29 | End: 2022-01-01

## 2021-12-29 RX ORDER — IBUPROFEN 600 MG/1
600 TABLET ORAL
Qty: 30 TABLET | Refills: 0 | Status: SHIPPED | OUTPATIENT
Start: 2021-12-29

## 2021-12-29 RX ORDER — IBUPROFEN 600 MG/1
600 TABLET ORAL
Status: DISCONTINUED | OUTPATIENT
Start: 2021-12-29 | End: 2021-12-29 | Stop reason: HOSPADM

## 2021-12-29 RX ADMIN — DOCUSATE SODIUM 100 MG: 100 CAPSULE ORAL at 08:38

## 2021-12-29 RX ADMIN — HYDROCODONE BITARTRATE AND ACETAMINOPHEN 1 TABLET: 5; 325 TABLET ORAL at 06:08

## 2021-12-29 RX ADMIN — HYDROCODONE BITARTRATE AND ACETAMINOPHEN 1 TABLET: 5; 325 TABLET ORAL at 10:44

## 2021-12-29 RX ADMIN — Medication 10 ML: at 06:00

## 2021-12-29 RX ADMIN — SODIUM CHLORIDE, SODIUM LACTATE, POTASSIUM CHLORIDE, CALCIUM CHLORIDE AND DEXTROSE MONOHYDRATE 125 ML/HR: 5; 600; 310; 30; 20 INJECTION, SOLUTION INTRAVENOUS at 02:34

## 2021-12-29 RX ADMIN — IBUPROFEN 600 MG: 600 TABLET, FILM COATED ORAL at 08:38

## 2021-12-29 NOTE — DISCHARGE SUMMARY
Discharge Summary     Name: Xi Darling MRN: [id number]  SSN: [id number]    YOB: 1970  Age: 46 y.o. Sex: female      Allergies: Erythromycin    Admit Date: 12/28/2021    Discharge Date: 12/29/2021      Admitting Physician: Laura Evans DO     * Admission Diagnoses: Dysmenorrhea, Menorrhagia and Urinary incontinence    * Discharge Diagnoses:   Hospital Problems as of 12/29/2021 Never Reviewed          Codes Class Noted - Resolved POA    Status post surgery ICD-10-CM: Z98.890  ICD-9-CM: V45.89  12/28/2021 - Present Unknown               * Procedures: Robotic Assisted Total Laparoscopic Hysterectomy  Bilateral Salpingo-Oophorectomy  Suburethral Sling    * Discharge Condition: [facility] Course: Normal hospital course for this procedure. Significant Diagnostic Studies:   Recent Results (from the past 24 hour(s))   HCG URINE, QL. - POC    Collection Time: 12/28/21  7:54 AM   Result Value Ref Range    Pregnancy test,urine (POC) Negative NEG     CBC W/O DIFF    Collection Time: 12/29/21  5:30 AM   Result Value Ref Range    WBC 15.1 (H) 3.6 - 11.0 K/uL    RBC 3.96 3.80 - 5.20 M/uL    HGB 10.9 (L) 11.5 - 16.0 g/dL    HCT 34.0 (L) 35.0 - 47.0 %    MCV 85.9 80.0 - 99.0 FL    MCH 27.5 26.0 - 34.0 PG    MCHC 32.1 30.0 - 36.5 g/dL    RDW 15.6 (H) 11.5 - 14.5 %    PLATELET 950 007 - 703 K/uL    MPV 9.5 8.9 - 12.9 FL    NRBC 0.0 0  WBC    ABSOLUTE NRBC 0.00 0.00 - 0.01 K/uL       * Disposition: Home    Discharge Medications:   Current Discharge Medication List      START taking these medications    Details   HYDROcodone-acetaminophen (NORCO) 5-325 mg per tablet Take 1 Tablet by mouth every six (6) hours as needed for Pain for up to 3 days. Max Daily Amount: 4 Tablets. Qty: 10 Tablet, Refills: 0  Start date: 12/29/2021, End date: 1/1/2022    Associated Diagnoses: Postoperative pain      ibuprofen (MOTRIN) 600 mg tablet Take 1 Tablet by mouth every six (6) hours as needed for Pain.   Qty: 30 Tablet, Refills: 0  Start date: 12/29/2021         CONTINUE these medications which have NOT CHANGED    Details   calcium-mag oxide-vitamin d3 185- mg-mg-unit cap Take 2 Tablets by mouth two (2) times a day. acetaminophen/diphenhydramine (TYLENOL PM PO) Take  by mouth nightly as needed. diphenhydramine HCl (UNISOM, DIPHENHYDRAMINE, PO) Take  by mouth nightly as needed. OTHER JUICE PLUS 1 DAILY              * Follow-up Care/Patient Instructions: Activity: No sex, douching, or tampons for 6 weeks or as directed by your physician. No heavy lifting for 6 weeks. No driving while taking pain medication.   Diet: Resume pre-hospital diet  Wound Care: Keep wound clean and dry    Follow-up Information     Follow up With Specialties Details Why Contact Info    Shantel Tom, Ericka[address] [facility] Gynecology, Gynecology, Obstetrics In 2 weeks  [address] [place] [id number] [phone]

## 2021-12-29 NOTE — PROGRESS NOTES
Bedside and Verbal shift change report given to TOÑITO Manuel RN (oncoming nurse) by Rochelle Branch RN (offgoing nurse). Report included the following information SBAR, Kardex, Intake/Output, MAR, Recent Results and Med Rec Status.

## 2021-12-29 NOTE — DISCHARGE INSTRUCTIONS
PATIENT DISCHARGE INSTRUCTIONS      PATIENT DISCHARGE INSTRUCTIONS    Consuela Sever / [id number] : 1970    Admitted 2021 Discharged: 2021       · It is important that you take the medication exactly as they are prescribed. · Keep your medication in the bottles provided by the pharmacist and keep a list of the medication names, dosages, and times to be taken in your wallet. · Do not take other medications without consulting your doctor. What to do at Home    Recommended Diet: Regular Diet    Recommended Activity: no lifting >10 pounds x 8 weeks, no sex, douching, tampons, bath/pool x 8 weeks. If you experience any of the following symptoms excessive vaginal bleeding, pain, nausea/vomiting or temp >100.6, please follow up with Dr Radha Kelly.

## 2021-12-29 NOTE — OP NOTES
[address]   OPERATIVE REPORT    Name:  Patti Joyner  MR#:  [id number]  :  1970  ACCOUNT #:  [de-identified]  DATE OF SERVICE:  2021      PREOPERATIVE DIAGNOSES:  Menorrhagia, dysmenorrhea, stress urinary incontinence. POSTOPERATIVE DIAGNOSES:  Menorrhagia, dysmenorrhea, stress urinary incontinence. PROCEDURES PERFORMED:  Alok Dela Cruz total laparoscopic hysterectomy and bilateral salpingo-oophorectomy, tension-free vaginal tape sling, cystoscopy. SURGEONS:  Marky Moyer DO and Milly Fountain MD    ASSISTANT:  Surgical assist on duty    ANESTHESIA:  General endotracheal    COMPLICATIONS:  None. SPECIMENS REMOVED:  Cervix, uterus, bilateral fallopian tubes and ovaries, and necrotic epiploica. IMPLANTS:  FloSeal.    ESTIMATED BLOOD LOSS:  Less than 100 mL. FINDINGS:  Exam under anesthesia revealed a 10-week size mobile anteverted uterus with no adnexal masses. Intraoperatively, there was a boggy 10-week size uterus. Bilateral fallopian tubes and ovaries appeared normal.  There was a 1-cm necrotic-appearing epiploica arising from the bowel. PROCEDURE:  The patient was consented including risks and benefits of the procedure and agreed to proceed. She was taken to the operating room where her anesthesia was found to be adequate. She was placed in the dorsal lithotomy position and prepped and draped in the usual sterile fashion. An exam under anesthesia was performed to reveal the above findings. A speculum was placed in the vagina to visualize the cervix and the anterior lip of the cervix was grasped with a single-tooth tenaculum. The cervix was gently and serially dilated to a size 6 Hegar. A large VCare uterine manipulator was then passed through the cervical canal and advanced to the uterine fundus. The uterus was sounded to 11 cm. The intrauterine balloon was inflated with normal saline.   The single-tooth tenaculum and speculum were removed and the cervical cup was passed through the vagina and advanced to attach snugly around the cervix The vaginal cup and then advanced to sit snuggly against the cervical cup and this was fixed in place. A Nj catheter was then placed in the bladder to drain to gravity. Attention was then turned to the abdomen. 0.5% Marcaine plain was injected in the infraumbilical fold and a small incision was made at this site. A Veress needle was passed through this incision to gain access to the peritoneal cavity. Proper intraperitoneal placement was confirmed with a saline water drop test.  A pneumoperitoneum with CO2 gas was obtained. Then, 0.5% Marcaine plain was injected approximately 6 cm above the umbilicus in the midline and an 8-mm skin incision was made at this site. 8-mm robotic trocar and port were passed through this incision under direct visualization with the laparoscope. The laparoscope and trocar were removed and the laparoscope was passed through the port. The patient was placed in Trendelenburg and the above findings were noted. 0.5% Marcaine plain was then injected in the anterior axillary line at the level of the umbilicus on the left as well as on the right. Robotic trocar and port were passed through the left side after an incision was made at the site of the lidocaine injection, and the trocar and port were placed under direct visualization with the laparoscope. The trocar was removed and the port was properly seated. In a similar fashion, an 8-mm skin incision was made at the site of lidocaine injection on the right side and an AirSeal accessory port was passed through this incision to gain access to the peritoneal cavity under direct visualization with the laparoscope. In the right upper quadrant FDC between the right lateral port and the umbilicus, 2.4% Marcaine plain was injected and another 8-mm skin incision was made with a scalpel.   An 8-mm robotic port was passed through this incision under direct visualization with the laparoscope and the trocar was removed and the port was properly seated. The robot was then side docked on the patient's left and the fenestrated bipolar was placed in the left robotic port and the monopolar scissors placed in the right robotic port. The right infundibulopelvic ligament was then cauterized and cut using the bipolar cautery followed by monopolar scissors. Good hemostasis was confirmed. The mesosalpinx was then incised in a serial fashion using bipolar cautery and monopolar scissors down to the level of the round ligament. The round ligament was then cauterized and cut with good hemostasis confirmed. The anterior leaf of the broad ligament was then incised from the right to the midline. The bladder was then gently dissected off the lower uterine segment and cervix. In a similar fashion, the left infundibulopelvic ligament was cauterized and cut with the bipolar cautery followed by monopolar scissors. Good hemostasis was confirmed. Several bites were taken to the level of the round ligament. The round ligament on the left was then cauterized and cut with Bovie cautery followed by monopolar scissors and the anterior leaf of the broad ligament was then incised from the left along the bladder  reflection in the midline. The bladder was again carefully dissected off the lower uterine segment and cervix. The uterine vessels were then skeletonized on each side and the right uterine vessels were cauterized and cut with good hemostasis confirmed. Several bites along the cervix down to the level of the cervical cup were performed to transect the uterosacral ligaments and cardinals on the right. In a similar fashion, the left uterine vessels were cauterized and cut with good hemostasis confirmed. Several bites along the cervix were performed down to the level of the cervical cup to transect the uterosacral ligaments and cardinals.   With the uterine vessel pedicles now dropped down laterally on each side, the colpotomy was performed anteriorly over the cervical cup and carried in a circumferential fashion to release the cervix and uterus from the vagina. The specimen was then brought through the vagina and left in the vagina to maintain the pneumoperitoneum. Copious suction irrigation was performed. The cuff was then closed with 2-0 Stratafix starting at each apex and crossing over in the midline. Bilateral ureters were noted to be peristalsing. The necrotic epiploica was again inspected and noted to be barely attached. This was excised with monopolar scissors and sent off to Pathology. Good hemostasis was confirmed at the site and the pelvis was again inspected and suction irrigated. Good hemostasis was confirmed other than just very slight ooziness at the left cuff and this was made hemostatic with FloSeal placed along the vaginal cuff and pedicles. The specimen was removed from the vagina and sent to pathology. The instruments were removed from the abdomen. Dr. Mateo Santos then took over. Details of his TVT and cystoscopy can be found in his dictated operative report. The abdominal incisions after the ports were removed were reapproximated with 4-0 Monocryl in a running subcuticular fashion and Dermabond was placed over these incisions.         Ольга Serrano, DO JACOBO/S_JOSE [id number]  D:  12/28/2021 12:41  T:  12/28/2021 19:02  JOB #:  [id number]

## 2021-12-29 NOTE — PROGRESS NOTES
Bedside and Verbal shift change report given to T> Pham Duncan  (oncoming nurse) by Nnamdi Izaguirre  (offgoing nurse). Report included the following information SBAR, Kardex, Intake/Output, MAR and Recent Results. I have reviewed discharge instructions with the patient and spouse. The patient and spouse verbalized understanding. Pt given copy of discharge instructions and updated on medication times.

## 2021-12-29 NOTE — PROGRESS NOTES
Medicare Outpatient Observation Notice (MOON) provided to patient/representative with verbal explanation of the notice. Time allotted for questions regarding the notice. Patient /representative provided a completed copy of the MOON notice. Copy placed on bedside chart.     Nidia Bardales MS

## 2021-12-29 NOTE — OP NOTES
[address] [facility]  OPERATIVE REPORT    Name:  Lit Smart  MR#:  [id number]  :  1970  ACCOUNT #:  [de-identified]  DATE OF SERVICE:  2021      PREOPERATIVE DIAGNOSIS:  Stress incontinence. POSTOPERATIVE DIAGNOSES:  Stress incontinence. Bladder lesion in the right trigone. PROCEDURE PERFORMED:  Tension-free vaginal tape, cystoscopy with directed biopsy. SURGEON:  Ismael Rocha MD    ASSISTANT:  None. ANESTHESIA:  General.    COMPLICATIONS:  none. SPECIMENS REMOVED:  Bladder bx of trigone. IMPLANTS:  TVT Exact. ESTIMATED BLOOD LOSS:  Less than 50 mL, combined for my portion of the case as well as Dr. Lucho Melton total laparoscopic hysterectomy and bilateral salpingo-oophorectomy. FLUIDS:  In 1 L, out 200. INDICATIONS:  The patient was confirmed to have stress urinary incontinence by urodynamic testing prior to surgery for hysterectomy for enlarged uterus. Please see Dr. Lucho Melton note for more information of chronic hysterectomy. PROCEDURE:  The patient had the informed consent performed. The patient was prepped and draped in the usual sterile fashion. After performing adequate anesthesia, time-out, and appropriate use of SCIP protocol antibiotics, decision was made to begin. Our attention was first turned to the vaginal mucosa. Dr. Nelson Coffey first performed total laparoscopic hysterectomy. Following hysterectomy the decision was made to procede for TVT sling. The mid urethra was identified by palpating the Nj balloon at the level of the internal urethral meatus. The internal urethral meatus was grasped with 2 Allis clamps. A vertical incision was made. Vaginal mucosa was opened up using sharp and blunt dissection with Metzenbaum scissors and the  vaginal mucosa was dissected away from the pubocervical fascia bilaterally until the pelvic diaphragm could be palpated.   The TVT sling was obtained, was passed through our skin incision to the right urethra. Aiming at the right midclavicular and midaxillary line, we penetrated the endopelvic fascia, turning immediately, and entered it going behind the pubic brim and through a skin marking 2.5 cm lateral to the midline. Same procedure was repeated on the contralateral side without difficulty. Cystoscopy was performed and confirmed normal functioning. There was normal-appearing urethral mucosa. No evidence of inadvertent cystotomy or urethrotomy. No tumors, lesions, or masses were noted other than a small 0.3 mm calcified, benign-appearing spot on the right trigone. Under direct visualization, this was biopsied and removed in total and sent to Pathology. Hemostasis was assured of the surgical site. Decision was made to pass the sling. The sling was passed with placement with 8 Hegar dilator. Proper placement was confirmed by transurethral palpation. The vaginal mucosa was closed with 2-0 Vicryl in a running locked stitch. The sling was cut to skin sites. The skin sites were closed with Dermabond. The patient was awakened and taken to recovery room in stable condition. All counts correct.         MD ADIN [id number]  D:  12/28/2021 12:37  T:  12/28/2021 20:29  JOB #:  [id number]  CC:  Maria Luisa Calvert MD

## 2021-12-29 NOTE — PROGRESS NOTES
Gynecology Progress Note    Patient doing well post-op day 1 from Procedure(s):  DAVINCI TOTAL LAPAROSCOPIC HYSTERECTOMY, BILATERAL SALPINGO-OOPHORECTOMY, TVT SLING, CYSTOSCOPY without significant complaints. Pain controlled on current medication. Voiding without difficulty-passed voiding trial this am. Patient is not passing flatus. Denies cp/sob/n/v. Has tolerated general diet and ambulating without problem. Vitals:  Blood pressure 128/73, pulse 85, temperature 98.4 °F (36.9 °C), resp. rate 16, height 5' 4\" (1.626 m), weight 71 kg (156 lb 8.4 oz), last menstrual period 2021, SpO2 99 %. Temp (24hrs), Av.1 °F (36.7 °C), Min:97.1 °F (36.2 °C), Max:99.5 °F (37.5 °C)        Exam:  Patient without distress. Heart regular rate and rhythm   Lungs CTA b/l               Abdomen soft,  Nontender, distended, positive bowel sounds x 4. Incisions dry and clean without erythema. Lower extremities are negative for swelling, cords, or tenderness. Lab/Data Review:  CBC:   Lab Results   Component Value Date/Time    WBC 15.1 (H) 2021 05:30 AM    HGB 10.9 (L) 2021 05:30 AM    HCT 34.0 (L) 2021 05:30 AM     2021 05:30 AM       Assessment and Plan:  Patient appears to be having uncomplicated post Procedure(s): POD#1 s/p  DAVINCI TOTAL LAPAROSCOPIC HYSTERECTOMY, BILATERAL SALPINGO-OOPHORECTOMY, TVT SLING, CYSTOSCOPY course. Continue routine post-op care. Plan for discharge home today pending flatus. Reviewed postop restrictions/precautions. Follow up in 2 weeks or sooner prn.

## 2022-03-19 PROBLEM — Z98.890 STATUS POST SURGERY: Status: ACTIVE | Noted: 2021-12-28

## 2022-12-15 NOTE — H&P
Care Due:                  Date            Visit Type   Department     Provider  --------------------------------------------------------------------------------                                EP -                              PRIMARY      [facility] PRIMARY  Last Visit: 08-      CARE ([facility])   CARE           [facility]                              FOLLOWUP/OF  [facility] PRIMARY  Next Visit: 02-      FICE VISIT   CARE           [facility] Dorman                                                            Last  Test          Frequency    Reason                     Performed    Due Date  --------------------------------------------------------------------------------    HBA1C.......  6 months...  insulin..................  09- 03-    [employer] Embedded Care Gaps. Reference number: [id number]. 12/15/2022   4:15:02 PM CST   Gynecology History and Physical    Name: Angel Rosales MRN: [id number] SSN: [id number]    YOB: 1970  Age: 46 y.o. Sex: female       Subjective:      Chief complaint:  Urinary incontinence    Danielle Matos is a 46 y.o.  female with a history of urinary incontinence. Previous workup included Ultrasound which revealed ovarian cyst and urodynamics which confirmed DALLAS. Previous treatment measures included kegels, home pt, and watchful waiting. She is admitted for Procedure(s) (LRB):  DAVINCI TOTAL LAPAROSCOPIC HYSTERECTOMY, BILATERAL SALPINGECTOMY, POSSIBLE BILATERAL OOPHORECTOMY, TVT SLING, CYSTOSCOPY (Bilateral). The current method of family planning is none. OB History    No obstetric history on file. Past Medical History:   Diagnosis Date    Adverse effect of anesthesia     SLOW WAKING PAST ANESTHESIA     Past Surgical History:   Procedure Laterality Date    HX GYN  1997    EXPLORATORY LAP FOR INFERTILITY    HX HEENT      WISDOM TEETH REMOVED     Social History     Occupational History    Not on file   Tobacco Use    Smoking status: Former Smoker     Packs/day: 0.25     Years: 5.00     Pack years: 1.25     Quit date: 1993     Years since quittin.4    Smokeless tobacco: Never Used   Vaping Use    Vaping Use: Never used   Substance and Sexual Activity    Alcohol use: Yes     Comment: OCCASIONALLY    Drug use: No    Sexual activity: Not on file     Family History   Problem Relation Age of Onset    Cancer Mother         BREAST    Hypertension Mother     Cancer Father         COLON RECTAL, PANCREAS    Alcohol abuse Father     COPD Father     Hypertension Sister     Migraines Sister     GERD Sister     Heart Disease Sister     No Known Problems Brother     Anesth Problems Neg Hx         Allergies   Allergen Reactions    Erythromycin Itching     Prior to Admission medications    Medication Sig Start Date End Date Taking?  Authorizing Provider   calcium-mag oxide-vitamin d3 185- mg-mg-unit cap Take 2 Tablets by mouth two (2) times a day. Yes Provider, Historical   acetaminophen/diphenhydramine (TYLENOL PM PO) Take  by mouth nightly as needed. Yes Provider, Historical   diphenhydramine HCl (UNISOM, DIPHENHYDRAMINE, PO) Take  by mouth nightly as needed. Yes Provider, Historical   OTHER JUICE PLUS 1 DAILY    Provider, Historical        Review of Systems:  A comprehensive review of systems was negative except for that written in the History of Present Illness. Objective:     Vitals:    12/28/21 0801   BP: 130/86   Pulse: 89   Resp: 18   Temp: 99.5 °F (37.5 °C)   SpO2: 99%   Weight: 71 kg (156 lb 8.4 oz)   Height: 5' 4\" (1.626 m)       Physical Exam:  Patient without distress. Heart: Regular rate and rhythm  Lung: clear to auscultation throughout lung fields, no wheezes, no rales, no rhonchi and normal respiratory effort  Back: costovertebral angle tenderness absent  Abdomen: soft, nontender  External Genitalia: normal general appearance  Urinary system: urethral meatus normal, urethal hypermobility and wes noted  Vagina: normal mucosa without prolapse or lesions  Cervix: normal appearance  Adnexa: normal bimanual exam  Uterus: normal single, nontender    Assessment:     Active Problems:    * No active hospital problems. *      Ovarian cyst  and Urinary incontinence with ovarian cyst  and urinary incontinence    Plan:     Procedure(s) (LRB):  DAVINCI TOTAL LAPAROSCOPIC HYSTERECTOMY, BILATERAL SALPINGECTOMY, POSSIBLE BILATERAL OOPHORECTOMY, TVT SLING, CYSTOSCOPY (Bilateral)  Discussed the risks of surgery including the risks of bleeding, infection, deep vein thrombosis, and surgical injuries to internal organs including but not limited to the bowels, bladder, rectum, and female reproductive organs. The patient understands the risks; any and all questions were answered to the patient's satisfaction.

## 2023-05-24 RX ORDER — IBUPROFEN 600 MG/1
600 TABLET ORAL EVERY 6 HOURS PRN
COMMUNITY
Start: 2021-12-29

## (undated) DEVICE — PAD PT POS 36 IN SURGYPAD DISP

## (undated) DEVICE — GYN LAPAROSCOPY - SMH: Brand: MEDLINE INDUSTRIES, INC.

## (undated) DEVICE — CYSTO/BLADDER IRRIGATION SET, REGULATING CLAMP

## (undated) DEVICE — GLOVE ORANGE PI 7   MSG9070

## (undated) DEVICE — SEAL UNIV 5-8MM DISP BX/10 -- DA VINCI XI - SNGL USE

## (undated) DEVICE — TRI-LUMEN FILTERED TUBE SET WITH ACTIVATED CHARCOAL FILTER: Brand: AIRSEAL

## (undated) DEVICE — SUTURE STRATAFIX SPRL PDS + SZ 2-0 L9IN ABSRB VLT CT-1 SXPP1B456

## (undated) DEVICE — BLADELESS OBTURATOR, LONG: Brand: WECK VISTA

## (undated) DEVICE — GLOVE SURG SZ 65 L12IN FNGR THK79MIL GRN LTX FREE

## (undated) DEVICE — APPLICATOR SEAL FLOSEAL 5MM+ --

## (undated) DEVICE — GLOVE SURG SZ 65 L12IN FNGR THK94MIL STD WHT LTX FREE

## (undated) DEVICE — SUTURE MCRYL SZ 3-0 L27IN ABSRB UD L19MM PS-2 3/8 CIR PRIM Y427H

## (undated) DEVICE — SOLUTION IRRIGATION H2O 0797305] ICU MEDICAL INC]

## (undated) DEVICE — PACK,BASIC,SIRUS,V: Brand: MEDLINE

## (undated) DEVICE — INSUFFLATION NEEDLE TO ESTABLISH PNEUMOPERITONEUM.: Brand: INSUFFLATION NEEDLE

## (undated) DEVICE — ELECTRO LUBE IS A SINGLE PATIENT USE DEVICE THAT IS INTENDED TO BE USED ON ELECTROSURGICAL ELECTRODES TO REDUCE STICKING.: Brand: KEY SURGICAL ELECTRO LUBE

## (undated) DEVICE — COVER MPLR TIP CRV SCIS ACC DA VINCI

## (undated) DEVICE — AIRSEAL 8 MM ACCESS PORT AND LOW PROFILE OBTURATOR WITH BLADELESS OPTICAL TIP, 120 MM LENGTH: Brand: AIRSEAL

## (undated) DEVICE — HYPODERMIC SAFETY NEEDLE: Brand: MONOJECT

## (undated) DEVICE — SYSTEM EVAC SMOKE LAPARSCOPIC

## (undated) DEVICE — VCARE LARGE, UTERINE MANIPULATOR, VAGINAL-CERVICAL-AHLUWALIA'S-RETRACTOR-ELEVATOR: Brand: VCARE

## (undated) DEVICE — SYR 10ML LUER LOK 1/5ML GRAD --

## (undated) DEVICE — ARM DRAPE